# Patient Record
Sex: FEMALE | Race: WHITE | URBAN - METROPOLITAN AREA
[De-identification: names, ages, dates, MRNs, and addresses within clinical notes are randomized per-mention and may not be internally consistent; named-entity substitution may affect disease eponyms.]

---

## 2018-06-06 ENCOUNTER — APPOINTMENT (RX ONLY)
Dept: URBAN - METROPOLITAN AREA SURGERY CENTER 6 | Facility: SURGERY CENTER | Age: 68
Setting detail: DERMATOLOGY
End: 2018-06-06

## 2018-06-06 DIAGNOSIS — L60.8 OTHER NAIL DISORDERS: ICD-10-CM

## 2018-06-06 PROBLEM — M12.9 ARTHROPATHY, UNSPECIFIED: Status: ACTIVE | Noted: 2018-06-06

## 2018-06-06 PROCEDURE — ? TREATMENT REGIMEN

## 2018-06-06 PROCEDURE — 99202 OFFICE O/P NEW SF 15 MIN: CPT

## 2018-06-06 PROCEDURE — ? COUNSELING

## 2018-06-06 ASSESSMENT — LOCATION DETAILED DESCRIPTION DERM
LOCATION DETAILED: LEFT INDEX FINGER LUNULA
LOCATION DETAILED: RIGHT SMALL FINGERNAIL
LOCATION DETAILED: RIGHT INDEX FINGER LUNULA
LOCATION DETAILED: RIGHT THUMBNAIL
LOCATION DETAILED: PERIUNGUAL SKIN LEFT RING FINGER
LOCATION DETAILED: PERIUNGUAL SKIN RIGHT RING FINGER
LOCATION DETAILED: LEFT THUMBNAIL
LOCATION DETAILED: RIGHT MIDDLE FINGERNAIL
LOCATION DETAILED: LEFT MIDDLE FINGERNAIL
LOCATION DETAILED: LEFT SMALL FINGERNAIL

## 2018-06-06 ASSESSMENT — LOCATION SIMPLE DESCRIPTION DERM
LOCATION SIMPLE: RIGHT INDEX FINGERNAIL
LOCATION SIMPLE: RIGHT SMALL FINGERNAIL
LOCATION SIMPLE: LEFT RING FINGER
LOCATION SIMPLE: RIGHT THUMBNAIL
LOCATION SIMPLE: LEFT INDEX FINGERNAIL
LOCATION SIMPLE: RIGHT RING FINGER
LOCATION SIMPLE: LEFT THUMBNAIL
LOCATION SIMPLE: LEFT SMALL FINGERNAIL
LOCATION SIMPLE: LEFT MIDDLE FINGERNAIL
LOCATION SIMPLE: RIGHT MIDDLE FINGERNAIL

## 2018-06-06 ASSESSMENT — LOCATION ZONE DERM
LOCATION ZONE: FINGER
LOCATION ZONE: FINGERNAIL

## 2018-06-06 NOTE — PROCEDURE: TREATMENT REGIMEN
Detail Level: Zone
Plan: Recommended to patient to wear rubber gloves when doing dishes and she may also find a topical nail strengthener to paint on fingernails.
Otc Regimen: Biotin supplement